# Patient Record
Sex: FEMALE | Race: BLACK OR AFRICAN AMERICAN | NOT HISPANIC OR LATINO
[De-identification: names, ages, dates, MRNs, and addresses within clinical notes are randomized per-mention and may not be internally consistent; named-entity substitution may affect disease eponyms.]

---
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
## 2023-11-30 ENCOUNTER — HOSPITAL ENCOUNTER (EMERGENCY)
Facility: CLINIC | Age: 23
Discharge: LEFT BEFORE DISCHARGE | End: 2023-11-30
Admitting: EMERGENCY MEDICINE
Payer: COMMERCIAL

## 2023-11-30 ENCOUNTER — TELEPHONE (OUTPATIENT)
Dept: EMERGENCY MEDICINE | Facility: CLINIC | Age: 23
End: 2023-11-30

## 2023-11-30 VITALS
WEIGHT: 146 LBS | TEMPERATURE: 99 F | OXYGEN SATURATION: 100 % | SYSTOLIC BLOOD PRESSURE: 107 MMHG | HEART RATE: 96 BPM | RESPIRATION RATE: 16 BRPM | DIASTOLIC BLOOD PRESSURE: 67 MMHG | BODY MASS INDEX: 26.87 KG/M2 | HEIGHT: 62 IN

## 2023-11-30 LAB
FLUAV RNA SPEC QL NAA+PROBE: POSITIVE
FLUBV RNA RESP QL NAA+PROBE: NEGATIVE
RSV RNA SPEC NAA+PROBE: NEGATIVE
SARS-COV-2 RNA RESP QL NAA+PROBE: NEGATIVE

## 2023-11-30 PROCEDURE — 99281 EMR DPT VST MAYX REQ PHY/QHP: CPT

## 2023-11-30 PROCEDURE — 250N000013 HC RX MED GY IP 250 OP 250 PS 637: Performed by: EMERGENCY MEDICINE

## 2023-11-30 PROCEDURE — 87637 SARSCOV2&INF A&B&RSV AMP PRB: CPT | Performed by: EMERGENCY MEDICINE

## 2023-11-30 RX ORDER — ACETAMINOPHEN 500 MG
1000 TABLET ORAL ONCE
Status: COMPLETED | OUTPATIENT
Start: 2023-11-30 | End: 2023-11-30

## 2023-11-30 RX ADMIN — ACETAMINOPHEN 1000 MG: 500 TABLET, FILM COATED ORAL at 12:42

## 2023-11-30 NOTE — LETTER
November 30, 2023        Josy Glynn  [address] [facility] [address]          Dear Josy Glynn:    You were seen in the [facility] Emergency Department at [facility] EMERGENCY DEPT on 11/30/2023.  We are unable to reach you by phone, so we are sending you this letter.     It is important that you call [facility] Emergency Department lab result nurse at [phone], as we have information to relay to you AND/OR we MAY have to make some changes in your treatment.    Best time to call back is between 9AM and 5:30PM, 7 days a week.      Sincerely,     [facility] Emergency Department Lab Result RN  [phone]

## 2023-11-30 NOTE — RESULT ENCOUNTER NOTE
Influenza A/B & SARS-COV2 (Covid-19) virus PCR mulitplex is positive for INFLUENZA A  Covid19 result is negative.  Patient will receive the Covid19 result via [employer] and a letter will be sent via [employer] (if active) or via the mail   Patient to be notified of Positive Influenza result and advised per [facility] Respiratory Virus Panel or Influenza A/B antigen protocol.

## 2023-11-30 NOTE — TELEPHONE ENCOUNTER
[facility] Emergency Department/Urgent Care Lab result notification  [Note:  ED Lab Results RN will reference the [facility] Emergency Dept visit note prior to contacting patient AND/OR prior to consulting Emergency Dept Provider.  Highlights of Emergency Dept visit in information summary at the bottom of this telephone note]    1. Reason for call  Notify of lab results  Assess patient symptoms [if necessary]  Review ED Providers recommendations/discharge instructions (if necessary)  Advise per [facility] ED lab result protocol    2. Lab Result (including Rx patient on, if applicable).  If culture, copy of lab report at bottom.  Influenza A/B & SARS-COV2 (Covid-19) virus PCR mulitplex is positive for INFLUENZA A  Covid19 result is negative.  Patient will receive the Covid19 result via [employer] and a letter will be sent via [employer] (if active) or via the mail  Patient to be notified of Positive Influenza result and advised per [facility] Respiratory Virus Panel or Influenza A/B antigen protocol.    Component      Latest Ref Rng 11/30/2023  12:42 PM   Influenza A      Negative  Positive !    Influenza B      Negative  Negative    Resp Syncytial Virus      Negative  Negative    SARS CoV2 PCR      Negative  Negative       Legend:  ! Abnormal    3. RN Assessment (Patient's current Symptoms):  Time of call: 2:50 PM;  Left message       4. RN Recommendations/Instructions per [place] ED lab result protocol  [facility] ED lab result protocol used: respiratory infection  Left voicemail message requesting a call back to [facility] ED Lab Result RN at [phone].  RN is available every day between 9 a.m. and 5:30 p.m.     Patient LWBS  ED Triage RN notes:  Patient states that pain in neck started earlier in the week. States that she is now having pain all over her body. Denies trauma. Denies loss of bowel or bladder. States took a muscle relaxer last night and advil this morning.              Allergies Allergies   Allergen Reactions    No Known Drug Allergy       Weight, if applicable Wt Readings from Last 2 Encounters:   11/17/09 40.7 kg (89 lb 12 oz) (91%, Z= 1.37)*   08/25/04 16.8 kg (37 lb) (63%, Z= 0.33)*     * Growth percentiles are based on [facility] (Girls, 2-20 Years) data.          Theo Urbina RN  [facility]  Emergency Dept Lab Result RN  Ph# [phone]